# Patient Record
Sex: FEMALE | Race: ASIAN | NOT HISPANIC OR LATINO | URBAN - METROPOLITAN AREA
[De-identification: names, ages, dates, MRNs, and addresses within clinical notes are randomized per-mention and may not be internally consistent; named-entity substitution may affect disease eponyms.]

---

## 2018-11-15 ENCOUNTER — OUTPATIENT (OUTPATIENT)
Dept: OUTPATIENT SERVICES | Facility: HOSPITAL | Age: 53
LOS: 1 days | End: 2018-11-15

## 2018-11-15 VITALS
DIASTOLIC BLOOD PRESSURE: 64 MMHG | WEIGHT: 110.89 LBS | HEART RATE: 73 BPM | RESPIRATION RATE: 14 BRPM | SYSTOLIC BLOOD PRESSURE: 120 MMHG | HEIGHT: 64 IN | OXYGEN SATURATION: 98 % | TEMPERATURE: 99 F

## 2018-11-15 DIAGNOSIS — N84.0 POLYP OF CORPUS UTERI: ICD-10-CM

## 2018-11-15 LAB
HCG SERPL-ACNC: < 5 MIU/ML — SIGNIFICANT CHANGE UP
HCT VFR BLD CALC: 37 % — SIGNIFICANT CHANGE UP (ref 34.5–45)
HGB BLD-MCNC: 11.6 G/DL — SIGNIFICANT CHANGE UP (ref 11.5–15.5)
MCHC RBC-ENTMCNC: 25.3 PG — LOW (ref 27–34)
MCHC RBC-ENTMCNC: 31.4 % — LOW (ref 32–36)
MCV RBC AUTO: 80.6 FL — SIGNIFICANT CHANGE UP (ref 80–100)
NRBC # FLD: 0 — SIGNIFICANT CHANGE UP
PLATELET # BLD AUTO: 285 K/UL — SIGNIFICANT CHANGE UP (ref 150–400)
PMV BLD: 10.9 FL — SIGNIFICANT CHANGE UP (ref 7–13)
RBC # BLD: 4.59 M/UL — SIGNIFICANT CHANGE UP (ref 3.8–5.2)
RBC # FLD: 15.1 % — HIGH (ref 10.3–14.5)
WBC # BLD: 7.31 K/UL — SIGNIFICANT CHANGE UP (ref 3.8–10.5)
WBC # FLD AUTO: 7.31 K/UL — SIGNIFICANT CHANGE UP (ref 3.8–10.5)

## 2018-11-15 RX ORDER — SODIUM CHLORIDE 9 MG/ML
1000 INJECTION, SOLUTION INTRAVENOUS
Qty: 0 | Refills: 0 | Status: DISCONTINUED | OUTPATIENT
Start: 2018-12-04 | End: 2018-12-05

## 2018-11-15 NOTE — H&P PST ADULT - PROBLEM SELECTOR PLAN 1
Scheduled for Dilation Curettage Hysteroscopy, Polypectomy on 12/04/18.  Lab results pending.  Preop, famotidine and urine collection instructions provided and questions addressed.

## 2018-11-15 NOTE — H&P PST ADULT - RS GEN PE MLT RESP DETAILS PC
breath sounds equal/clear to auscultation bilaterally/respirations non-labored/no rhonchi/no wheezes/no rales

## 2018-11-15 NOTE — H&P PST ADULT - FAMILY HISTORY
Mother  Still living? No  Family history of stomach cancer, Age at diagnosis: Age Unknown     Father  Still living? Yes, Estimated age: Age Unknown  Family history of diabetes mellitus in father, Age at diagnosis: Age Unknown  Family history of hypertension in father, Age at diagnosis: Age Unknown     Sibling  Still living? Yes, Estimated age: Age Unknown  Family history of diabetes mellitus, Age at diagnosis: Age Unknown  Family history of hypertension, Age at diagnosis: Age Unknown

## 2018-11-15 NOTE — H&P PST ADULT - GASTROINTESTINAL DETAILS
nontender/no masses palpable/bowel sounds normal/no rebound tenderness/no rigidity/no organomegaly/no bruit/no guarding/no distention/soft

## 2018-11-15 NOTE — H&P PST ADULT - ATTENDING COMMENTS
52 yo P0 with history of endometrial polyps presents to office for d&c hysteroscopy removal of endometrial polyps. Patient was evaluated in the office for abdominal discomfort. Pelvic ultrasound revealed two endometrial polyps. The options were discussed with patient. The options include but not limited to no intervention vs d&c.  Recommend d&c polypectomy as it is diagnostic and therapeutic. The patient verbalized understanding. Risks and benefits of procedure discussed. risks include but not limited to infection, bleeding, uterine perforation, anesthesia risks. All questions and concerns addressed to full satisfaction. MBeauvil

## 2018-11-15 NOTE — H&P PST ADULT - HISTORY OF PRESENT ILLNESS
53 yr old female with no significant medical hx presents for preop evaluation with dx of Polyp of Corpus Uteri.  Pt was evaluated by GYN s/p sonogram, and is now scheduled for Dilation Curettage Hysteroscopy, Polypectomy on 12/04/18.

## 2018-12-03 ENCOUNTER — TRANSCRIPTION ENCOUNTER (OUTPATIENT)
Age: 53
End: 2018-12-03

## 2018-12-04 ENCOUNTER — OUTPATIENT (OUTPATIENT)
Dept: OUTPATIENT SERVICES | Facility: HOSPITAL | Age: 53
LOS: 1 days | Discharge: HOME | End: 2018-12-04
Payer: COMMERCIAL

## 2018-12-04 VITALS
TEMPERATURE: 98 F | DIASTOLIC BLOOD PRESSURE: 65 MMHG | OXYGEN SATURATION: 100 % | SYSTOLIC BLOOD PRESSURE: 113 MMHG | HEART RATE: 61 BPM | RESPIRATION RATE: 16 BRPM

## 2018-12-04 VITALS
TEMPERATURE: 98 F | SYSTOLIC BLOOD PRESSURE: 120 MMHG | DIASTOLIC BLOOD PRESSURE: 76 MMHG | RESPIRATION RATE: 12 BRPM | WEIGHT: 110.01 LBS | OXYGEN SATURATION: 100 % | HEART RATE: 63 BPM | HEIGHT: 64 IN

## 2018-12-04 DIAGNOSIS — N84.0 POLYP OF CORPUS UTERI: ICD-10-CM

## 2018-12-04 LAB — HCG UR QL: NEGATIVE — SIGNIFICANT CHANGE UP

## 2018-12-04 PROCEDURE — 88305 TISSUE EXAM BY PATHOLOGIST: CPT | Mod: 26

## 2018-12-04 NOTE — ASU DISCHARGE PLAN (ADULT/PEDIATRIC). - A. DRIVE A CAR, OPERATE POWER TOOLS OR MACHINERY
----- Message from Tee De Anda MD sent at 10/20/2017  1:30 PM CDT -----  Please let patient know that the rest of her lab tests look normal.  Remind her to call me on Monday on her symptoms.   Statement Selected

## 2018-12-04 NOTE — ASU DISCHARGE PLAN (ADULT/PEDIATRIC). - NOTIFY
Pain not relieved by Medications/Bleeding that does not stop/Numbness, color, or temperature change to extremity/Swelling that continues/Persistent Nausea and Vomiting/Numbness, tingling/Inability to Tolerate Liquids or Foods/Increased Irritability or Sluggishness/GYN Fever>100.4/Excessive Diarrhea/Unable to Urinate Pain not relieved by Medications/GYN Fever>100.4/Persistent Nausea and Vomiting/Unable to Urinate/Inability to Tolerate Liquids or Foods/Bleeding that does not stop

## 2018-12-04 NOTE — ASU DISCHARGE PLAN (ADULT/PEDIATRIC). - ACTIVITY LEVEL
no douching/no heavy lifting/no tampons/no intercourse/nothing per vagina/no tub baths for two weeks/no heavy lifting/nothing per vagina/no intercourse/no tub baths/no douching/no tampons

## 2018-12-04 NOTE — ASU DISCHARGE PLAN (ADULT/PEDIATRIC). - NURSING INSTRUCTIONS
You were given 1000mg IV Tylenol for pain management.  Please DO NOT take any Tylenol containing products, such as  Vicodin, Percocet, Excedrin, many cold preparations for the next 6 hours (until 145p).  DO NOT EXCEED 3000MG OF TYLENOL OVER 24 HOURS.   You were given Toradol for pain management. Please DO Not take Motrin/Ibuprofen/Advil/Aleve (NSAIDS) for the next 6 hours (Until 2p)

## 2018-12-06 LAB — SURGICAL PATHOLOGY STUDY: SIGNIFICANT CHANGE UP

## 2021-07-02 NOTE — ASU PATIENT PROFILE, ADULT - TRANSFUSION HX COMMENT, PROFILE
n/a Eucrisa Counseling: Patient may experience a mild burning sensation during topical application. Eucrisa is not approved in children less than 2 years of age.

## 2021-10-25 NOTE — BRIEF OPERATIVE NOTE - PROCEDURE
<<-----Click on this checkbox to enter Procedure Diagnostic hysteroscopy with dilation and curettage of uterus  12/04/2018    Active  [id number] Yes

## 2022-10-27 NOTE — BRIEF OPERATIVE NOTE - TYPE OF ANESTHESIA
General Tetracycline Counseling: Patient counseled regarding possible photosensitivity and increased risk for sunburn.  Patient instructed to avoid sunlight, if possible.  When exposed to sunlight, patients should wear protective clothing, sunglasses, and sunscreen.  The patient was instructed to call the office immediately if the following severe adverse effects occur:  hearing changes, easy bruising/bleeding, severe headache, or vision changes.  The patient verbalized understanding of the proper use and possible adverse effects of tetracycline.  All of the patient's questions and concerns were addressed. Patient understands to avoid pregnancy while on therapy due to potential birth defects.

## 2023-08-22 PROBLEM — N84.0 POLYP OF CORPUS UTERI: Chronic | Status: ACTIVE | Noted: 2018-11-15

## 2024-02-07 ENCOUNTER — APPOINTMENT (OUTPATIENT)
Dept: GASTROENTEROLOGY | Facility: CLINIC | Age: 59
End: 2024-02-07

## 2024-04-09 NOTE — H&P PST ADULT - CARDIOVASCULAR DETAILS
[FreeTextEntry8] : + cough, no fever, dry, She does not feel ill. Says she feels fine but her kids wanted her checked out. She feels that it is due to the season change.  Two days ago pt went to the bathroom and had a hard time getting up. She does not have a bar. She says she did not fall, but kind of rolled off the toilet and sat on the floor. Pt says she was yelling for someone to pull her up. It was hard to get her up. She has been asking for a bar in the bathroom. She says she has no issue walking. She demonstrates that she can get up out of the chair and walks. She shuffles and has a cane.  positive S2/positive S1